# Patient Record
Sex: MALE | Race: WHITE | NOT HISPANIC OR LATINO | URBAN - METROPOLITAN AREA
[De-identification: names, ages, dates, MRNs, and addresses within clinical notes are randomized per-mention and may not be internally consistent; named-entity substitution may affect disease eponyms.]

---

## 2017-09-01 ASSESSMENT — MIFFLIN-ST. JEOR: SCORE: 1768.19

## 2017-09-05 ENCOUNTER — ANESTHESIA - HEALTHEAST (OUTPATIENT)
Dept: SURGERY | Facility: HOSPITAL | Age: 27
End: 2017-09-05

## 2017-09-05 ENCOUNTER — SURGERY - HEALTHEAST (OUTPATIENT)
Dept: SURGERY | Facility: HOSPITAL | Age: 27
End: 2017-09-05

## 2017-09-05 ASSESSMENT — MIFFLIN-ST. JEOR: SCORE: 1810.83

## 2021-05-31 VITALS — HEIGHT: 72 IN | BODY MASS INDEX: 24.57 KG/M2 | WEIGHT: 181.4 LBS

## 2021-06-12 NOTE — ANESTHESIA POSTPROCEDURE EVALUATION
Patient: Chung Waters  BILATERAL SAGITTAL SPLIT RAMUS OSTEOTOMY  Anesthesia type: general    Patient location: PACU  Last vitals:   Vitals:    09/05/17 1920   BP: 125/67   Pulse: 89   Resp: 14   Temp:    SpO2: 97%     Post vital signs: stable  Level of consciousness: awake and responds to simple questions  Post-anesthesia pain: pain controlled  Post-anesthesia nausea and vomiting: no  Pulmonary: unassisted, return to baseline  Cardiovascular: stable and blood pressure at baseline  Hydration: adequate  Anesthetic events: no    QCDR Measures:  ASA# 11 - Lucille-op Cardiac Arrest: ASA11B - Patient did NOT experience unanticipated cardiac arrest  ASA# 12 - Lucille-op Mortality Rate: ASA12B - Patient did NOT die  ASA# 13 - PACU Re-Intubation Rate: ASA13B - Patient did NOT require a new airway mgmt  ASA# 10 - Composite Anes Safety: ASA10A - No serious adverse event    Additional Notes:

## 2021-06-12 NOTE — ANESTHESIA CARE TRANSFER NOTE
Last vitals:   Vitals:    09/05/17 1812   BP: 136/80   Pulse: 89   Resp: 12   Temp: 37.3  C (99.1  F)   SpO2: (P) 98%     Patient's level of consciousness is drowsy  Spontaneous respirations: yes  Maintains airway independently: no: nasal airway in place  Dentition unchanged: yes  Oropharynx: nasal airway in place    QCDR Measures:  ASA# 20 - Surgical Safety Checklist: WHO surgical safety checklist completed prior to induction  PQRS# 430 - Adult PONV Prevention: 4558F - Pt received => 2 anti-emetic agents (different classes) preop & intraop  ASA# 8 - Peds PONV Prevention: NA - Not pediatric patient, not GA or 2 or more risk factors NOT present  PQRS# 424 - Lucille-op Temp Management: 4559F - At least one body temp DOCUMENTED => 35.5C or 95.9F within required timeframe  PQRS# 426 - PACU Transfer Protocol: - Transfer of care checklist used  ASA# 14 - Acute Post-op Pain: ASA14B - Patient did NOT experience pain >= 7 out of 10     Patient obstructing on arrival in PACU. Nasal airway placed per CRNA. Improved oxygenation, maintaining sats. Notified Dr. Correa that nasal airway was placed.

## 2021-06-12 NOTE — ANESTHESIA PREPROCEDURE EVALUATION
Anesthesia Evaluation      History of anesthetic complications     Airway   Mallampati: II  Neck ROM: full   Pulmonary - negative ROS and normal exam                          Cardiovascular - negative ROS and normal exam   Neuro/Psych      Comments: Hx of ADD    Endo/Other - negative ROS      GI/Hepatic/Renal - negative ROS      Other findings: Mandibular hypoplasia      Dental                         Anesthesia Plan  Planned anesthetic: general endotracheal  Nasotracheal  Glidescope  ASA 2   Induction: intravenous   Anesthetic plan and risks discussed with: patient  Anesthesia plan special considerations: video-assisted,   Post-op plan: routine recovery

## 2021-06-16 PROBLEM — M26.04: Status: ACTIVE | Noted: 2017-09-06
